# Patient Record
Sex: MALE | Race: WHITE
[De-identification: names, ages, dates, MRNs, and addresses within clinical notes are randomized per-mention and may not be internally consistent; named-entity substitution may affect disease eponyms.]

---

## 2018-09-05 ENCOUNTER — HOSPITAL ENCOUNTER (EMERGENCY)
Dept: HOSPITAL 80 - CED | Age: 18
Discharge: HOME | End: 2018-09-05
Payer: MEDICAID

## 2018-09-05 VITALS — DIASTOLIC BLOOD PRESSURE: 63 MMHG | SYSTOLIC BLOOD PRESSURE: 134 MMHG
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
## 2018-09-05 DIAGNOSIS — S49.91XA: Primary | ICD-10-CM
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
## 2018-09-05 PROCEDURE — A4565 SLINGS: HCPCS

## 2018-09-05 NOTE — EDPHY
H & P


Stated Complaint: right shoulder injury


Time Seen by Provider: 09/05/18 18:35


HPI/ROS: 





CHIEF COMPLAINT:  Right shoulder pain





HISTORY OF PRESENT ILLNESS:  The patient is a 18-year-old man who was diving 

for football and states he landed on his elbow and jammed his shoulder.  He had 

immediate pain to his right shoulder posteriorly.  He has no pain with 

elevation of his shoulder anteriorly or laterally but has pain with rotation 

behind his back.  No clavicle pain.  No neck pain.  No head injury.


Severity:  Moderate


Modifying factors:  Is movement as above





REVIEW OF SYSTEMS:


Constitutional:  denies: chills, fever, recent illness, recent injury


EENTM: denies: blurred vision, double vision, nose congestion


Respiratory: denies: cough, shortness of breath


Cardiac: denies: chest pain, irregular heart rate, lightheadedness, palpitations


Gastrointestinal/Abdominal: denies: abdominal pain, diarrhea, nausea, vomiting, 

blood streaked stools


Genitourinary: denies: dysuria, frequency, hematuria, pain


Musculoskeletal:  See HPI


Skin: denies: lesions, rash, jaundice, bruising


Neurological: denies: headache, numbness, paresthesia, tingling, dizziness, 

weakness


Hematologic/Lymphatic: denies: blood clots, easy bleeding, easy bruising


Immunologic/allergic: denies: HIV/AIDS, transplant


 10 systems reviewed and negative except as noted





EXAM:


GENERAL:  Well-appearing, well-nourished and in no acute distress.


HEAD:  Atraumatic, normocephalic.


EYES:  Pupils equal round and reactive to light, extraocular movements intact, 

sclera anicteric, conjunctiva are normal.


ENT:  TMs normal, nares patent, oropharynx clear without exudates.  Moist 

mucous membranes.


NECK:  Normal range of motion, supple without lymphadenopathy or JVD.


LUNGS:  Breath sounds clear to auscultation bilaterally and equal.  No wheezes 

rales or rhonchi.


HEART:  Regular rate and rhythm without murmurs, rubs or gallops.


ABDOMEN:  Soft, nontender, normoactive bowel sounds.  No guarding, no rebound.  

No masses appreciated. 


BACK:  No CVA tenderness, no spinal tenderness, step-offs or deformities


EXTREMITIES:  No clavicle tenderness or deformity.  No obvious step-offs.  No 

pain with axial loading or pulling.  Pain to the right shoulder with rotation 

and turning our behind back.  Normal pulses and sensation distally.  No left-

sided injuries.


NEUROLOGICAL:  Cranial nerves II through XII grossly intact.  Normal speech, 

normal gait.  5/5 strength, normal movement in all extremities, normal sensation

, normal reflexes


PSYCH:  Normal mood, normal affect.


SKIN:  Warm, dry, normal turgor, no visible rashes or lesions.








Source: Patient


Exam Limitations: No limitations





- Medical/Surgical History


Hx Asthma: No


Hx Chronic Respiratory Disease: No


Hx Diabetes: No


Hx Cardiac Disease: No


Hx Renal Disease: No


Hx Cirrhosis: No


Hx Alcoholism: No


Hx HIV/AIDS: No


Hx Splenectomy or Spleen Trauma: No


Other PMH: none





- Family History


Significant Family History: No pertinent family hx





- Social History


Smoking Status: Never smoked


Alcohol Use: Sober


Drug Use: None


Constitutional: 


 Initial Vital Signs











Temperature (C)  37 C   09/05/18 18:33


 


Heart Rate  90   09/05/18 18:33


 


Respiratory Rate  16   09/05/18 18:33


 


Blood Pressure  134/63 H  09/05/18 18:33


 


O2 Sat (%)  97   09/05/18 18:33








 











O2 Delivery Mode               Room Air














Allergies/Adverse Reactions: 


 





No Known Allergies Allergy (Unverified 09/05/18 18:38)


 








Home Medications: 














 Medication  Instructions  Recorded


 


NK [No Known Home Meds]  09/05/18














Medical Decision Making





- Diagnostics


Imaging Results: 


 Imaging Impressions





Shoulder X-Ray  09/05/18 18:38


Impression: Normal Right shoulder series.











Imaging: Discussed imaging studies w/ On call Radiologist


Procedures: 





Procedure:  Splint placement.





A sling was applied.  After application of the splint I returned and re-

examined the patient.  The splint was adequately immobilizing the joint and 

distal to the splint the patient's circulation and sensation was intact.


ED Course/Re-evaluation: 





We discussed the x-rays which are reassuring.  I suspect ligamentous injury.  

We will place the patient in a sling and have him follow up with Orthopedics 

for further treatment if needed.


Differential Diagnosis: 





Partial list of the Differential diagnosis considered include but were not 

limited to;  rotator cuff injury, muscle strain, AC separation and although 

unlikely based on the history and physical exam, I also considered clavicle 

fracture, dislocation, nerve injury.  I discussed these differential diagnoses 

and the plan with the patient as well as the usual and expected course.  The 

patient understands that the diagnosis is provisional and that in medicine we 

are not always correct and that further workup is often warranted.  Usual and 

customary warnings were given.  All of the patient's questions were answered.  

The patient was instructed to return to the emergency department should the 

symptoms at all worsen or return, otherwise to followup with the physician as 

we discussed.





Departure





- Departure


Disposition: Home, Routine, Self-Care


Clinical Impression: 


Shoulder pain, right


Qualifiers:


 Chronicity: acute Qualified Code(s): M25.511 - Pain in right shoulder





Condition: Good


Instructions:  Shoulder Pain (ED)


Referrals: 


NONE *PRIMARY CARE P,. [Primary Care Provider] - As per Instructions


Joe Kitchen MD [Medical Doctor] - As per Instructions

## 2019-03-17 ENCOUNTER — HOSPITAL ENCOUNTER (EMERGENCY)
Dept: HOSPITAL 80 - CED | Age: 19
Discharge: LEFT BEFORE DISCHARGE | End: 2019-03-17
Payer: MEDICAID

## 2019-03-17 DIAGNOSIS — Z53.21: Primary | ICD-10-CM
